# Patient Record
Sex: FEMALE | Race: WHITE | HISPANIC OR LATINO | Employment: FULL TIME | URBAN - METROPOLITAN AREA
[De-identification: names, ages, dates, MRNs, and addresses within clinical notes are randomized per-mention and may not be internally consistent; named-entity substitution may affect disease eponyms.]

---

## 2018-01-17 NOTE — PROGRESS NOTES
Assessment    1  Bacterial vaginosis (616 10,041 9) (N76 0,A49 9)   2  Vulvovaginal candidiasis (112 1) (B37 3)    Plan  Bacterial vaginosis    · MetroNIDAZOLE 500 MG Oral Tablet; TAKE 1 TABLET TWICE DAILY UNTIL  FINISHED   Rx By: Alyson Lara; Dispense: 7 Days ; #:14 Tablet; Refill: 0; For: Bacterial vaginosis; JERRY = N; Verified Transmission to [id number] ; Last Updated By: System, SureScripts; 2/1/2016 2:41:03 PM  PMH: Encounter for routine gynecological examination with Papanicolaou smear of cervix    · [address]; Status:Resulted - Requires Verification;   Done: [id number] 02:56PM   Performed: In Office; JOAQUINA:[id number]; Today; [address]: Encounter for routine gynecological examination with Papanicolaou smear of cervix; Ordered By:Moiz Bolton;  Vulvovaginal candidiasis    · Fluconazole 150 MG Oral Tablet; TAKE 1 TABLET 1 TIME ONLY   Rx By: Alyson Lara; Dispense: 1 Days ; #:1 Tablet; Refill: 1; For: Vulvovaginal candidiasis; JERRY = N; Verified Transmission to [address] ; Last Updated By: System, SureScripts; 2/1/2016 2:41:04 PM    Discussion/Summary  Discussion Summary:   Pt verbalized understanding of all discussed  GYN Discussion and Summary:   Vaginitis-- Impression: bacterial vaginosis  The diagnostic plan includes wet prep, KOH preparation, vaginal pH and whiff  Candidiasis-- Impression: vaginal candidiasis  Currently, the condition is moderate in severity  The diagnostic plan includes vaginal pH, KOH preparation, wet prep and whiff test  Treatment plan includes wearing loose fitting cotton clothing and avoiding douching  Medication SE Review and Pt Understands Tx: Possible side effects of new medications were reviewed with the patient/guardian today  The treatment plan was reviewed with the patient/guardian   The patient/guardian understands and agrees with the treatment plan   Counseling Documentation With Imm: The was counseled regarding diagnostic results, instructions for management, risk factor reductions, prognosis, patient and family education, impressions, risks and benefits of treatment options, importance of compliance with treatment  Chief Complaint  Chief Complaint Free Text Note Form: Patient is c/o vaginal discharge and odor  History of Present Illness  HPI: Pt states she periodic discharge which causes, irritation and a odor      Active Problems    1  Hearing Loss (389 9)   2  Seasonal allergies (477 9) (J30 2)   3  Sinus congestion (478 19) (R09 81)   4  Tension type headache (339 10) (G44 209)    Past Medical History    1  History of Encounter for routine gynecological examination with Papanicolaou smear of   cervix (V72 31,V76 2) (K90 430,L46 2)   2  History of pregnancy (V13 29)   3  History of urinary frequency (V13 09) (Z87 898)   4  Personal history of urinary tract infection (V13 02) (Z87 440)    Family History    1  Family history of Bleeding ulcer   2  Family history of     Social History    · Never A Smoker   · No drug use   · Social alcohol use (F10 99)    Current Meds   1  No Reported Medications  Requested for: 38LNC2167 Recorded    Allergies    1  Penicillins    Vitals  Vital Signs [Data Includes: Current Encounter]    Recorded: 16JTA6882 88:65SM   Systolic 506   Diastolic 73   Height 5 ft 4 17 in   Weight 166 lb    BMI Calculated 28 34   BSA Calculated 1 81     Physical Exam    Genitourinary   External genitalia: Normal and no lesions appreciated  Vagina: Abnormal   Vagina: moderate, foul smelling, white and thin vaginal discharge  Urethra: Normal     Urethral meatus: Normal     Cervix: Abnormal   Examination of the cervix revealed moderate, white and thin discharge     Psychiatric   Orientation to person, place, and time: Normal     Mood and affect: Normal        Future Appointments    Date/Time Provider Specialty Site   2016 02:45 PM Kaitlin Spring MD Gastroenterology Adult  [address] [facility]     Signatures   Electronically signed by :  GAEL Zuniga; Feb 1 2016  3:01PM EST                       (Author)

## 2018-10-23 ENCOUNTER — OFFICE VISIT (OUTPATIENT)
Dept: URGENT CARE | Age: 38
End: 2018-10-23
Payer: COMMERCIAL

## 2018-10-23 VITALS
WEIGHT: 151.4 LBS | HEART RATE: 51 BPM | HEIGHT: 64 IN | TEMPERATURE: 97.2 F | OXYGEN SATURATION: 98 % | RESPIRATION RATE: 16 BRPM | DIASTOLIC BLOOD PRESSURE: 80 MMHG | BODY MASS INDEX: 25.85 KG/M2 | SYSTOLIC BLOOD PRESSURE: 119 MMHG

## 2018-10-23 DIAGNOSIS — Z91.09 ENVIRONMENTAL ALLERGIES: ICD-10-CM

## 2018-10-23 DIAGNOSIS — J06.9 ACUTE URI: Primary | ICD-10-CM

## 2018-10-23 PROCEDURE — 99203 OFFICE O/P NEW LOW 30 MIN: CPT | Performed by: PHYSICIAN ASSISTANT

## 2018-10-23 RX ORDER — BROMPHENIRAMINE MALEATE, PSEUDOEPHEDRINE HYDROCHLORIDE, AND DEXTROMETHORPHAN HYDROBROMIDE 2; 30; 10 MG/5ML; MG/5ML; MG/5ML
5 SYRUP ORAL 4 TIMES DAILY PRN
Qty: 120 ML | Refills: 0 | Status: SHIPPED | OUTPATIENT
Start: 2018-10-23

## 2018-10-23 NOTE — PROGRESS NOTES
[email or web address]        NAME: Shaista Krishnan is a 41 y o  female  : 1980    MRN: [id number]  DATE: 2018  TIME: 8:49 AM    Assessment and Plan   Acute URI [J06 9]  1  Acute URI  brompheniramine-pseudoephedrine-DM 30-2-10 MG/5ML syrup   2  Environmental allergies           Patient Instructions     Take Bromfed DM as prescribed  Continue Claritin   Fluids and rest  Nasal saline spray; Afrin if severe congestion (do not use for more than 3 days)  Over the counter decongestant  Tylenol/Ibuprofen for pain/fever  Salt water gargles and chloraseptic spray  Throat Coat Tea  Warm compresses over sinuses  Steam treatment (utilize proper safety precautions when in contact with hot water/steam)  Follow up with PCP in 3-5 days  Proceed to  ER if symptoms worsen  Chief Complaint     Chief Complaint   Patient presents with   Zoe Mall Like Symptoms     Pt reports she started a week ago this past  with dry cough and intermittent sore throat and bilateral earn pain  Took Claritin with no relief  Denies fever  History of Present Illness       1 week ago began with "goopy eyes" that has since resolved  Admits to scratchy throat "that is making me cough"  Cough   This is a new problem  The current episode started in the past 7 days  The problem has been unchanged  The cough is non-productive  Associated symptoms include ear pain and a sore throat  Pertinent negatives include no chest pain, chills, ear congestion, fever, headaches, heartburn, hemoptysis, myalgias, nasal congestion, postnasal drip, rash, rhinorrhea, shortness of breath, sweats, weight loss or wheezing  Treatments tried: claritin  The treatment provided no relief  Her past medical history is significant for environmental allergies  There is no history of asthma, bronchitis or pneumonia         Review of Systems   Review of Systems   Constitutional: Negative for activity change, appetite change, chills, fatigue, fever and weight loss  HENT: Positive for ear pain and sore throat  Negative for congestion, ear discharge, postnasal drip, rhinorrhea, sinus pain, sinus pressure, sneezing and trouble swallowing  Eyes: Negative for itching  Respiratory: Positive for cough  Negative for hemoptysis, chest tightness, shortness of breath and wheezing  Cardiovascular: Negative for chest pain  Gastrointestinal: Negative for abdominal pain, diarrhea, heartburn, nausea and vomiting  Musculoskeletal: Negative for myalgias  Skin: Negative for rash  Allergic/Immunologic: Positive for environmental allergies  Neurological: Negative for light-headedness and headaches  Current Medications       Current Outpatient Prescriptions:     brompheniramine-pseudoephedrine-DM 30-2-10 MG/5ML syrup, Take 5 mL by mouth 4 (four) times a day as needed for cough, Disp: 120 mL, Rfl: 0    Current Allergies     Allergies as of 10/23/2018 - Reviewed 10/23/2018   Allergen Reaction Noted    Penicillins  01/28/2013            The following portions of the patient's history were reviewed and updated as appropriate: allergies, current medications, past family history, past medical history, past social history, past surgical history and problem list      No past medical history on file  History reviewed  No pertinent surgical history  History reviewed  No pertinent family history  Medications have been verified  Objective   /80   Pulse (!) 51   Temp (!) 97 2 °F (36 2 °C)   Resp 16   Ht 5' 4" (1 626 m)   Wt 68 7 kg (151 lb 6 4 oz)   LMP 10/05/2018 (Exact Date)   SpO2 98%   BMI 25 99 kg/m²        Physical Exam     Physical Exam   Constitutional: She is oriented to person, place, and time  She appears well-developed and well-nourished  No distress  HENT:   Head: Normocephalic     Right Ear: External ear normal    Left Ear: External ear normal    Nose: Nose normal    Mouth/Throat: Oropharynx is clear and moist  No oropharyngeal exudate  Eyes: Conjunctivae are normal    Cardiovascular: Normal rate, regular rhythm, normal heart sounds and intact distal pulses  Exam reveals no gallop and no friction rub  No murmur heard  Pulmonary/Chest: Effort normal and breath sounds normal  No respiratory distress  She has no wheezes  She has no rales  She exhibits no tenderness  Lymphadenopathy:     She has no cervical adenopathy  Neurological: She is alert and oriented to person, place, and time  Skin: Skin is warm  She is not diaphoretic  Psychiatric: She has a normal mood and affect  Her behavior is normal  Judgment and thought content normal    Vitals reviewed

## 2018-10-23 NOTE — LETTER
October 23, 2018     Patient: Sonal Stokes   YOB: 1980   Date of Visit: 10/23/2018       To Whom It May Concern: It is my medical opinion that Silvino Jain may return to work on 10/25/2018  If you have any questions or concerns, please don't hesitate to call           Sincerely,        Lisa Valenzuela PA-C    CC: No Recipients

## 2018-10-23 NOTE — PATIENT INSTRUCTIONS
Take Bromfed DM as prescribed  Continue Claritin   Fluids and rest  Nasal saline spray; Afrin if severe congestion (do not use for more than 3 days)  Over the counter decongestant  Tylenol/Ibuprofen for pain/fever  Salt water gargles and chloraseptic spray  Throat Coat Tea  Warm compresses over sinuses  Steam treatment (utilize proper safety precautions when in contact with hot water/steam)  Follow up with PCP in 3-5 days  Proceed to  ER if symptoms worsen  Cold Symptoms, Ambulatory Care   GENERAL INFORMATION:   Cold symptoms  include sneezing, dry throat, a stuffy nose, headache, watery eyes, and a cough  Your cough may be dry, or you may cough up mucus  You may also have muscle aches, joint pain, and tiredness  Rarely, you may have a fever  Cold symptoms occur from inflammation in your upper respiratory system caused by a virus  Most colds go away without treatment  Seek immediate care for the following symptoms:   · A heartbeat that is much faster than usual for you     · A swollen neck that is sore to the touch     · Increased tiredness and weakness    · Pinpoint or larger reddish-purple dots on your skin     · Poor or no appetite  Treatment for cold symptoms  may include NSAIDS to decrease muscle aches and fever  Do not give NSAID medicines to children under 10months of age without direction from your child's doctor  Cold medicines may also be given to decrease coughing, nasal stuffiness, sneezing, and a runny nose  Do not give cold medicines to children under 11years of age without direction from your child's doctor  Manage your cold symptoms with the following:   · Drink liquids  to help thin and loosen thick mucus so you can cough it up  Liquids will also keep you hydrated  Ask your healthcare provider which liquids are best for you and how much to drink each day  · Do not smoke  because it may worsen your symptoms and increase the length of time you feel sick   Talk with your healthcare provider if you need help to stop smoking  Prevent the spread of germs  by washing your hands often  You can spread your cold germs to others for at least 3 days after your symptoms start  Do not share items, such as eating utensils  Cover your nose and mouth when you cough or sneeze using the crook of your elbow instead of your hands  Throw used tissues in the garbage  Follow up with your healthcare provider as directed:  Write down your questions so you remember to ask them during your visits  CARE AGREEMENT:   You have the right to help plan your care  Learn about your health condition and how it may be treated  Discuss treatment options with your caregivers to decide what care you want to receive  You always have the right to refuse treatment  The above information is an  only  It is not intended as medical advice for individual conditions or treatments  Talk to your doctor, nurse or pharmacist before following any medical regimen to see if it is safe and effective for you  © 2014 [address] is for End User's use only and may not be sold, redistributed or otherwise used for commercial purposes  All illustrations and images included in CareNotes® are the copyrighted property of A D A M [employer] Inc  or Toni Britt

## 2023-08-21 ENCOUNTER — OCCMED (OUTPATIENT)
Dept: URGENT CARE | Facility: CLINIC | Age: 43
End: 2023-08-21
Payer: OTHER MISCELLANEOUS

## 2023-08-21 DIAGNOSIS — S39.012A STRAIN OF LUMBAR REGION, INITIAL ENCOUNTER: Primary | ICD-10-CM

## 2023-08-21 PROCEDURE — 96372 THER/PROPH/DIAG INJ SC/IM: CPT

## 2023-08-21 PROCEDURE — 99283 EMERGENCY DEPT VISIT LOW MDM: CPT

## 2023-08-21 PROCEDURE — G0382 LEV 3 HOSP TYPE B ED VISIT: HCPCS

## 2023-08-25 ENCOUNTER — APPOINTMENT (OUTPATIENT)
Dept: URGENT CARE | Facility: CLINIC | Age: 43
End: 2023-08-25
Payer: OTHER MISCELLANEOUS

## 2023-08-25 PROCEDURE — 99213 OFFICE O/P EST LOW 20 MIN: CPT | Performed by: NURSE PRACTITIONER

## 2024-09-12 ENCOUNTER — OFFICE VISIT (OUTPATIENT)
Dept: URGENT CARE | Age: 44
End: 2024-09-12
Payer: COMMERCIAL

## 2024-09-12 VITALS
OXYGEN SATURATION: 99 % | DIASTOLIC BLOOD PRESSURE: 72 MMHG | SYSTOLIC BLOOD PRESSURE: 112 MMHG | TEMPERATURE: 98.3 F | HEART RATE: 59 BPM | RESPIRATION RATE: 18 BRPM

## 2024-09-12 DIAGNOSIS — H60.501 ACUTE OTITIS EXTERNA OF RIGHT EAR, UNSPECIFIED TYPE: Primary | ICD-10-CM

## 2024-09-12 PROCEDURE — 99213 OFFICE O/P EST LOW 20 MIN: CPT | Performed by: EMERGENCY MEDICINE

## 2024-09-12 RX ORDER — OFLOXACIN 3 MG/ML
10 SOLUTION AURICULAR (OTIC) DAILY
Qty: 10 ML | Refills: 0 | Status: SHIPPED | OUTPATIENT
Start: 2024-09-12

## 2024-09-12 NOTE — PATIENT INSTRUCTIONS
Use antibiotic ear drops as prescribed  Avoid Q-Tip use  Tylenol/Ibuprofen for discomfort  Fluids  Change pillowcase daily  Follow up with PCP in 3-5 days.  Proceed to ER if symptoms worsen.

## 2024-09-12 NOTE — PROGRESS NOTES
[facility]        NAME: Radhika Phipps is a 43 y.o. female  : 1980    MRN: [id number]  DATE: 2024  TIME: 7:10 PM    Assessment and Plan   Acute otitis externa of right ear, unspecified type [H60.501]  1. Acute otitis externa of right ear, unspecified type  ofloxacin (FLOXIN) 0.3 % otic solution            Patient Instructions     Use antibiotic ear drops as prescribed  Avoid Q-Tip use  Tylenol/Ibuprofen for discomfort  Fluids  Change pillowcase daily  Follow up with PCP in 3-5 days.  Proceed to ER if symptoms worsen.    If tests are performed, our office will contact you with results only if changes need to made to the care plan discussed with you at the visit. [facility].    Chief Complaint     Chief Complaint   Patient presents with    Earache     Started today with an earache to the R ear. Denies any hearing issues/crackling.          History of Present Illness       Patient is a 42 yo female with no significant PMH of seasonal allergies presenting in the clinic today for ear pain which began this morning. Admits right ear pain, rhinorrhea, cough, and headache. Denies ear discharge, decrease hearing, sore throat, fever, chills, chest pain, and SOB. Admits the use of for tylenol sx management. Positive recent sick contacts at work.    Earache   There is pain in the right ear. This is a recurrent problem. The current episode started today. The problem occurs constantly. The problem has been waxing and waning. The pain is at a severity of 7/10. Associated symptoms include coughing, headaches and rhinorrhea. Pertinent negatives include no abdominal pain, diarrhea, ear discharge, hearing loss, neck pain, rash, sore throat or vomiting.       Review of Systems   Review of Systems   Constitutional:  Negative for chills, fatigue and fever.   HENT:  Positive for ear pain and rhinorrhea. Negative for congestion, ear discharge, hearing loss,  postnasal drip, sinus pressure, sinus pain and sore throat.    Respiratory:  Positive for cough. Negative for shortness of breath.    Cardiovascular:  Negative for chest pain.   Gastrointestinal:  Negative for abdominal pain, diarrhea, nausea and vomiting.   Musculoskeletal:  Negative for myalgias and neck pain.   Skin:  Negative for rash.   Neurological:  Positive for headaches.         Current Medications       Current Outpatient Medications:     ofloxacin (FLOXIN) 0.3 % otic solution, Administer 10 drops to the right ear daily, Disp: 10 mL, Rfl: 0    brompheniramine-pseudoephedrine-DM 30-2-10 MG/5ML syrup, Take 5 mL by mouth 4 (four) times a day as needed for cough, Disp: 120 mL, Rfl: 0    Current Allergies     Allergies as of 09/12/2024 - Reviewed 09/12/2024   Allergen Reaction Noted    Penicillins  01/28/2013            The following portions of the patient's history were reviewed and updated as appropriate: allergies, current medications, past family history, past medical history, past social history, past surgical history and problem list.     History reviewed. No pertinent past medical history.    History reviewed. No pertinent surgical history.    History reviewed. No pertinent family history.      Medications have been verified.        Objective   /72   Pulse 59   Temp 98.3 °F (36.8 °C)   Resp 18   LMP 09/10/2024 (Approximate)   SpO2 99%        Physical Exam     Physical Exam  Vitals reviewed.   Constitutional:       General: She is not in acute distress.     Appearance: Normal appearance. She is normal weight. She is not ill-appearing.   HENT:      Head: Normocephalic.      Jaw: Tenderness (along right TMJ) present.      Right Ear: Hearing, tympanic membrane, ear canal and external ear normal. Swelling and tenderness present. No drainage. No middle ear effusion. There is no impacted cerumen. Tympanic membrane is not erythematous or bulging.      Left Ear: Hearing, tympanic membrane, ear canal  and external ear normal. No drainage, swelling or tenderness.  No middle ear effusion. There is no impacted cerumen. Tympanic membrane is not erythematous or bulging.      Nose: Nose normal. No congestion or rhinorrhea.      Right Sinus: No maxillary sinus tenderness or frontal sinus tenderness.      Left Sinus: No maxillary sinus tenderness or frontal sinus tenderness.      Mouth/Throat:      Lips: Pink.      Mouth: Mucous membranes are moist.      Pharynx: Oropharynx is clear. Uvula midline. No pharyngeal swelling, oropharyngeal exudate, posterior oropharyngeal erythema or uvula swelling.   Eyes:      General:         Right eye: No discharge.         Left eye: No discharge.      Conjunctiva/sclera: Conjunctivae normal.   Cardiovascular:      Rate and Rhythm: Normal rate and regular rhythm.      Pulses: Normal pulses.      Heart sounds: Normal heart sounds. No murmur heard.     No friction rub. No gallop.   Pulmonary:      Effort: Pulmonary effort is normal.      Breath sounds: Normal breath sounds. No wheezing, rhonchi or rales.   Musculoskeletal:      Cervical back: Normal range of motion and neck supple. No tenderness.   Lymphadenopathy:      Cervical: No cervical adenopathy.   Skin:     General: Skin is warm.      Findings: No rash.   Neurological:      Mental Status: She is alert.   Psychiatric:         Mood and Affect: Mood normal.         Behavior: Behavior normal.

## 2025-05-06 ENCOUNTER — OFFICE VISIT (OUTPATIENT)
Dept: URGENT CARE | Facility: CLINIC | Age: 45
End: 2025-05-06
Payer: COMMERCIAL

## 2025-05-06 VITALS
RESPIRATION RATE: 18 BRPM | OXYGEN SATURATION: 97 % | BODY MASS INDEX: 35.63 KG/M2 | SYSTOLIC BLOOD PRESSURE: 124 MMHG | WEIGHT: 207.6 LBS | TEMPERATURE: 98.2 F | HEART RATE: 66 BPM | DIASTOLIC BLOOD PRESSURE: 70 MMHG

## 2025-05-06 DIAGNOSIS — M54.2 NECK PAIN ON LEFT SIDE: Primary | ICD-10-CM

## 2025-05-06 PROCEDURE — 99213 OFFICE O/P EST LOW 20 MIN: CPT

## 2025-05-06 RX ORDER — CYCLOBENZAPRINE HCL 10 MG
10 TABLET ORAL 3 TIMES DAILY PRN
Qty: 15 TABLET | Refills: 0 | Status: SHIPPED | OUTPATIENT
Start: 2025-05-06

## 2025-05-06 RX ORDER — PREDNISONE 10 MG/1
50 TABLET ORAL DAILY
Qty: 25 TABLET | Refills: 0 | Status: SHIPPED | OUTPATIENT
Start: 2025-05-06 | End: 2025-05-11

## 2025-05-06 NOTE — PROGRESS NOTES
[facility]        NAME: Radhika Phipps is a 44 y.o. female  : 1980    MRN: [id number]  DATE: May 6, 2025  TIME: 4:11 PM    Assessment and Plan   Neck pain on left side [M54.2]  1. Neck pain on left side  predniSONE 10 mg tablet    cyclobenzaprine (FLEXERIL) 10 mg tablet            Patient Instructions       Follow up with PCP in 3-5 days.  Proceed to  ER if symptoms worsen.    If tests have been performed at [facility], our office will contact you with results if changes need to be made to the care plan discussed with you at the visit.  You can review your full results on [facility].    Chief Complaint     Chief Complaint   Patient presents with    Neck Pain     Started on , describes the pain as sharp that's radiating down to her left shoulder blade, denies any specific injury, she states she had a virtual visit yesterday and was prescribed Meloxicam with little relief          History of Present Illness       44-year-old female presents for atraumatic left-sided cervical neck pain x 3 days.  Patient denies any known injuries or changes in physical activity.  Patient admits she did have a telehealth visit 2 days ago, was given meloxicam and is taking as directed.  Patient admits to no relief.  Patient does feel radiation down to the shoulder.        Review of Systems   Review of Systems   Constitutional:  Negative for chills and fever.   Eyes:  Negative for visual disturbance.   Musculoskeletal:  Positive for neck pain. Negative for joint swelling.   Skin:  Negative for color change and rash.         Current Medications       Current Outpatient Medications:     cyclobenzaprine (FLEXERIL) 10 mg tablet, Take 1 tablet (10 mg total) by mouth 3 (three) times a day as needed for muscle spasms, Disp: 15 tablet, Rfl: 0    predniSONE 10 mg tablet, Take 5 tablets (50 mg total) by mouth daily for 5 days, Disp: 25 tablet, Rfl: 0    brompheniramine-pseudoephedrine-DM 30-2-10 MG/5ML syrup, Take 5  mL by mouth 4 (four) times a day as needed for cough, Disp: 120 mL, Rfl: 0    ofloxacin (FLOXIN) 0.3 % otic solution, Administer 10 drops to the right ear daily, Disp: 10 mL, Rfl: 0    Current Allergies     Allergies as of 05/06/2025 - Reviewed 05/06/2025   Allergen Reaction Noted    Penicillins  01/28/2013            The following portions of the patient's history were reviewed and updated as appropriate: allergies, current medications, past family history, past medical history, past social history, past surgical history and problem list.     No past medical history on file.    No past surgical history on file.    No family history on file.      Medications have been verified.        Objective   /70   Pulse 66   Temp 98.2 °F (36.8 °C) (Tympanic)   Resp 18   Wt 94.2 kg (207 lb 9.6 oz)   LMP 04/27/2025 (Approximate)   SpO2 97%   BMI 35.63 kg/m²   Patient's last menstrual period was 04/27/2025 (approximate).       Physical Exam     Physical Exam  Vitals and nursing note reviewed.   Constitutional:       General: She is not in acute distress.     Appearance: She is not toxic-appearing.   HENT:      Head: Normocephalic and atraumatic.   Eyes:      Extraocular Movements: Extraocular movements intact.      Conjunctiva/sclera: Conjunctivae normal.      Pupils: Pupils are equal, round, and reactive to light.   Neck:      Comments: Tenderness outpatient of cervical paraspinals on the left side  No bony tenderness, crepitus, step-offs  Range of motion decreased secondary to pain  Pulmonary:      Effort: Pulmonary effort is normal.   Musculoskeletal:      Cervical back: Normal range of motion. Tenderness present.   Lymphadenopathy:      Cervical: No cervical adenopathy.   Skin:     Findings: No rash.   Neurological:      Mental Status: She is alert.      Coordination: Coordination normal.      Gait: Gait normal.   Psychiatric:         Mood and Affect: Mood normal.         Behavior: Behavior normal.